# Patient Record
Sex: MALE | Race: WHITE
[De-identification: names, ages, dates, MRNs, and addresses within clinical notes are randomized per-mention and may not be internally consistent; named-entity substitution may affect disease eponyms.]

---

## 2018-07-09 ENCOUNTER — HOSPITAL ENCOUNTER (EMERGENCY)
Dept: HOSPITAL 62 - ER | Age: 53
Discharge: HOME | End: 2018-07-09
Payer: COMMERCIAL

## 2018-07-09 VITALS — DIASTOLIC BLOOD PRESSURE: 88 MMHG | SYSTOLIC BLOOD PRESSURE: 129 MMHG

## 2018-07-09 DIAGNOSIS — R10.32: ICD-10-CM

## 2018-07-09 DIAGNOSIS — R10.30: ICD-10-CM

## 2018-07-09 DIAGNOSIS — N13.2: Primary | ICD-10-CM

## 2018-07-09 DIAGNOSIS — R10.9: ICD-10-CM

## 2018-07-09 LAB
ADD MANUAL DIFF: NO
ALBUMIN SERPL-MCNC: 4.4 G/DL (ref 3.5–5)
ALP SERPL-CCNC: 61 U/L (ref 38–126)
ALT SERPL-CCNC: 29 U/L (ref 21–72)
ANION GAP SERPL CALC-SCNC: 15 MMOL/L (ref 5–19)
APPEARANCE UR: CLEAR
APTT PPP: YELLOW S
AST SERPL-CCNC: 31 U/L (ref 17–59)
BASOPHILS # BLD AUTO: 0 10^3/UL (ref 0–0.2)
BASOPHILS NFR BLD AUTO: 0.3 % (ref 0–2)
BILIRUB DIRECT SERPL-MCNC: 0.4 MG/DL (ref 0–0.4)
BILIRUB SERPL-MCNC: 0.7 MG/DL (ref 0.2–1.3)
BILIRUB UR QL STRIP: NEGATIVE
BUN SERPL-MCNC: 17 MG/DL (ref 7–20)
CALCIUM: 9.6 MG/DL (ref 8.4–10.2)
CHLORIDE SERPL-SCNC: 100 MMOL/L (ref 98–107)
CO2 SERPL-SCNC: 23 MMOL/L (ref 22–30)
EOSINOPHIL # BLD AUTO: 0.2 10^3/UL (ref 0–0.6)
EOSINOPHIL NFR BLD AUTO: 3 % (ref 0–6)
ERYTHROCYTE [DISTWIDTH] IN BLOOD BY AUTOMATED COUNT: 13.4 % (ref 11.5–14)
GLUCOSE SERPL-MCNC: 94 MG/DL (ref 75–110)
GLUCOSE UR STRIP-MCNC: NEGATIVE MG/DL
HCT VFR BLD CALC: 42.4 % (ref 37.9–51)
HGB BLD-MCNC: 14.9 G/DL (ref 13.5–17)
KETONES UR STRIP-MCNC: NEGATIVE MG/DL
LIPASE SERPL-CCNC: 66.2 U/L (ref 23–300)
LYMPHOCYTES # BLD AUTO: 3.4 10^3/UL (ref 0.5–4.7)
LYMPHOCYTES NFR BLD AUTO: 52.5 % (ref 13–45)
MCH RBC QN AUTO: 30.8 PG (ref 27–33.4)
MCHC RBC AUTO-ENTMCNC: 35.1 G/DL (ref 32–36)
MCV RBC AUTO: 88 FL (ref 80–97)
MONOCYTES # BLD AUTO: 0.6 10^3/UL (ref 0.1–1.4)
MONOCYTES NFR BLD AUTO: 8.8 % (ref 3–13)
NEUTROPHILS # BLD AUTO: 2.3 10^3/UL (ref 1.7–8.2)
NEUTS SEG NFR BLD AUTO: 35.4 % (ref 42–78)
NITRITE UR QL STRIP: NEGATIVE
PH UR STRIP: 5 [PH] (ref 5–9)
PLATELET # BLD: 232 10^3/UL (ref 150–450)
POTASSIUM SERPL-SCNC: 3.8 MMOL/L (ref 3.6–5)
PROT SERPL-MCNC: 7.5 G/DL (ref 6.3–8.2)
PROT UR STRIP-MCNC: NEGATIVE MG/DL
RBC # BLD AUTO: 4.82 10^6/UL (ref 4.35–5.55)
SODIUM SERPL-SCNC: 138.4 MMOL/L (ref 137–145)
SP GR UR STRIP: 1.02
TOTAL CELLS COUNTED % (AUTO): 100 %
UROBILINOGEN UR-MCNC: NEGATIVE MG/DL (ref ?–2)
WBC # BLD AUTO: 6.5 10^3/UL (ref 4–10.5)

## 2018-07-09 PROCEDURE — 36415 COLL VENOUS BLD VENIPUNCTURE: CPT

## 2018-07-09 PROCEDURE — 83690 ASSAY OF LIPASE: CPT

## 2018-07-09 PROCEDURE — 80053 COMPREHEN METABOLIC PANEL: CPT

## 2018-07-09 PROCEDURE — 85025 COMPLETE CBC W/AUTO DIFF WBC: CPT

## 2018-07-09 PROCEDURE — S0119 ONDANSETRON 4 MG: HCPCS

## 2018-07-09 PROCEDURE — 96372 THER/PROPH/DIAG INJ SC/IM: CPT

## 2018-07-09 PROCEDURE — 99284 EMERGENCY DEPT VISIT MOD MDM: CPT

## 2018-07-09 PROCEDURE — 81001 URINALYSIS AUTO W/SCOPE: CPT

## 2018-07-09 PROCEDURE — 76380 CAT SCAN FOLLOW-UP STUDY: CPT

## 2018-07-09 NOTE — RADIOLOGY REPORT (SQ)
EXAM DESCRIPTION:  CT LTD RENAL STONE PROTOCOL ON



COMPLETED DATE/TIME:  7/9/2018 10:10 pm



REASON FOR STUDY:  left flank pain



COMPARISON:  None.



TECHNIQUE:  CT scan of the abdomen and pelvis performed without intravenous or oral contrast. Images 
reviewed with lung, soft tissue, and bone windows. Reconstructed coronal and sagittal MPR images revi
ewed. All images stored on PACS.

All CT scanners at this facility use dose modulation, iterative reconstruction, and/or weight based d
osing when appropriate to reduce radiation dose to as low as reasonably achievable (ALARA).

[facility]: Dose Right  [facility]: CareDose    [facility]: Dose Right    [facility]: Teradose 4D    [facility]: Smart [employer]



RADIATION DOSE:  CT Rad equipment meets quality standard of care and radiation dose reduction techniq
ues were employed. CTDIvol: 10.8 mGy. DLP: 584 mGy-cm.mGy.



LIMITATIONS:  None.



FINDINGS:  LOWER CHEST: No significant findings. No nodules or infiltrates.

NON-CONTRASTED LIVER, SPLEEN, ADRENALS: Evaluation limited by lack of IV contrast. No identified sign
ificant masses.

PANCREAS: No masses. No peripancreatic inflammatory changes.

GALLBLADDER: No identified stones by CT criteria. No inflammatory changes to suggest cholecystitis.

RIGHT KIDNEY AND URETER: No suspicious masses. Assessment limited by lack of IV contrast.   No signif
icant calcifications.   No hydronephrosis or hydroureter.

LEFT KIDNEY AND URETER: No suspicious masses. Assessment limited by lack of IV contrast.   Tiny intra
renal calculi.  2 mm calculus in the distal left ureter about 15 mm from the UVJ.   Mild hydronephros
is/ hydroureter.

AORTA AND RETROPERITONEUM: No aneurysm. No retroperitoneal masses or adenopathy.

BOWEL AND PERITONEAL CAVITY: No obvious masses or inflammatory changes. No free fluid.

APPENDIX: Normal.

PELVIS, BLADDER, AND ABDOMINAL WALL:No abnormal masses. No free fluid. Bladder normal.

BONES: No significant findings.

OTHER: No other significant finding.



IMPRESSION:  Left hydronephrosis and hydroureter secondary to the presence of a 2 mm calculus in the 
distal left ureter near the UVJ.  Findings as described.



COMMENT:  Quality ID # 436: Final reports with documentation of one or more dose reduction techniques
 (e.g., Automated exposure control, adjustment of the mA and/or kV according to patient size, use of 
iterative reconstruction technique)



TECHNICAL DOCUMENTATION:  JOB ID:  [id number]

 2011 [employer]- All Rights Reserved



Reading location - IP/workstation name: [email or web address]

## 2018-07-09 NOTE — ER DOCUMENT REPORT
HPI





- HPI


Pain Level: 5


Notes: 





Patient is a 53-year-old male with a history of ulcerative colitis and kidney 

stones who presents to the ED complaining of left flank pain that radiates 

around into his groin times 4 hours.  Pain is constant at this time.  Patient 

states that his symptoms started somewhat suddenly.  Patient states that his 

pain and symptoms mimic that of when he had a kidney stone in the past.  

Patient states that he is still able to eat and drink.  He is urinating 

normally and having normal bowel movements.  He denies any smoking or IV drug 

use.  No other significant past medical history.  Patient is unaware of 

anything that worsens his pain at this time.  He denies any injury.  Denies any 

headache, fever, URI, sore throat, chest pain, palpitations, syncope, cough, 

shortness of breath, wheeze, dyspnea, abdominal pain, nausea/vomiting/diarrhea, 

urinary retention, dysuria, hematuria, loss of control of bowel or bladder, 

numbness/tingling, saddle anesthesia, muscle paralysis/weakness, or rash.





- ROS


Systems Reviewed and Negative: Yes All other systems reviewed and negative





- DERM


Skin Color: Normal, Pink





Past Medical History





- Social History


Smoking Status: Never Smoker


Chew tobacco use (# tins/day): No


Frequency of alcohol use: None


Drug Abuse: None


Family History: Reviewed & Not Pertinent


Patient has suicidal ideation: No


Patient has homicidal ideation: No


Renal/ Medical History: Reports: Hx Kidney Stones.  Denies: Hx Peritoneal 

Dialysis





[facility] Provider Document





- CONSTITUTIONAL


Agree With Documented VS: Yes


Notes: 





PHYSICAL EXAMINATION:





GENERAL: Well-appearing, well-nourished and in no acute distress. 





LUNGS: Breath sounds clear to auscultation bilaterally and equal.  No wheezes 

rales or rhonchi.





HEART: Regular rate and rhythm without murmurs, rubs, gallops.





ABDOMEN: Soft, nontender, nondistended abdomen.  No guarding, no rebound.  No 

masses appreciated.  Normal bowel sounds present.  + mild left CVA tenderness.  

No pulsatile mass





Musculoskeletal: LE's b/l:  FROM to passive/active. Strength 5+/5.  No deficits 

noted.  No bony tenderness of extremities.





Back:  FROM to passive/active.  Strength 5+/5.  No vertebral point tenderness, 

stepoffs, or deformities.  No other bony tenderness, erythema, swelling, or 

ecchymosis.  SLR negative b/l.  No SI jt tenderness.  No foot drop





Extremities:  No cyanosis, clubbing, or edema b/l.  Peripheral pulses 2+.  

Capillary refill less than 2 seconds.





NEUROLOGICAL: Normal speech, normal gait.  Normal sensory, motor exams.  

Reflexes 2+ b/l.  





PSYCH: Normal mood, normal affect.





SKIN: Warm, Dry, normal turgor, no rashes or lesions noted.





Course





- Re-evaluation


Re-evalutation: 





07/09/18 22:47


Patient is an afebrile, well-hydrated, 53-year-old male who presents to the ED 

with a ureteral stone on the left side, 2 mm.  Vitals are acceptable without 

any significant tachycardia, tachypnea, or hypoxia.  PE is otherwise 

unremarkable.  CBC, CMP, urinalysis were unremarkable for acute pathology.  See 

CT scan results.  Patient is nontoxic-appearing and is tolerating p.o. without 

difficulties.  Patient was given morphine, Toradol, and Zofran.  Patient's 

abdomen is soft and nontender.  No other labs or imaging warranted at this time 

based on H&P.  Low suspicion/risk for acute appendicitis, bowel obstruction, 

acute cholecystitis, perforated diverticulitis, incarcerated hernia, 

pancreatitis, perforated ulcer, peritonitis, sepsis, or other systemic emergent 

condition at this time.  Patient is aware that his condition can change from 

initial presentation and he needs to monitor symptoms closely and seek medical 

attention if any acute changes.  I will send him home with a prescription for 

Flomax, Zofran, and morphine.  Conservative measures otherwise for symptoms.  

Recheck with PCM in 3-5 days.  Consider consult with a urologist.  Return to 

the ED with any worsening/concerning symptoms otherwise as reviewed in 

discharge.  Patient is in agreement.








- Vital Signs


Vital signs: 


 











Temp Pulse Resp BP Pulse Ox


 


 97.5 F   62   16   138/89 H  97 


 


 07/09/18 21:04  07/09/18 21:04  07/09/18 21:04  07/09/18 21:04  07/09/18 21:04














- Laboratory


Result Diagrams: 


 07/09/18 20:55





 07/09/18 20:55


Laboratory results interpreted by me: 


 











  07/09/18





  20:55


 


Seg Neutrophils %  35.4 L


 


Lymphocytes %  52.5 H














Discharge





- Discharge


Clinical Impression: 


 Ureteral stone with hydronephrosis





Condition: Stable


Disposition: HOME, SELF-CARE


Instructions:  Kidney Stone ([facility]), Antinausea Medication ([facility])


Additional Instructions: 


Push fluids (i.e. water, cranberry juice)


Proper hygenic technique


Keep the skin clean


Tylenol/ibuprofen as needed


Take medications as directed


F/u with your PCM in 3-5 days for a recheck


Consider consult with a Urologist for ongoing/worsening symptoms.


Return to the ED with any worsening symptoms and/or development of fever, 

headache, chest pain, palpitations, syncope, shortness of breath, trouble 

breathing, abdominal pain, n/v/d, blood in stool/urine, loss of control of bowel

/bladder, urinary retention, or other worsening symptoms that are concerning to 

you.


Prescriptions: 


Morphine Sulfate [Morphine Ir 15 Mg Tablet] 15 mg PO TID #15 tablet


Ondansetron [Zofran Odt 4 mg Tablet] 1 - 2 tab PO Q4H PRN #15 tab.rapdis


 PRN Reason: For Nausea/Vomiting


Tamsulosin HCl [Flomax] 0.4 mg PO DAILY #10 cap.er.24h


Forms:  Elevated Blood Pressure


Referrals: 


[facility] [Provider Group] - Follow up as needed